# Patient Record
Sex: MALE | Race: OTHER | HISPANIC OR LATINO
[De-identification: names, ages, dates, MRNs, and addresses within clinical notes are randomized per-mention and may not be internally consistent; named-entity substitution may affect disease eponyms.]

---

## 2018-03-09 ENCOUNTER — APPOINTMENT (OUTPATIENT)
Dept: FAMILY MEDICINE | Facility: CLINIC | Age: 43
End: 2018-03-09
Payer: COMMERCIAL

## 2018-03-09 VITALS
DIASTOLIC BLOOD PRESSURE: 78 MMHG | BODY MASS INDEX: 29.72 KG/M2 | HEART RATE: 94 BPM | WEIGHT: 239 LBS | SYSTOLIC BLOOD PRESSURE: 121 MMHG | TEMPERATURE: 97.9 F | HEIGHT: 75 IN | OXYGEN SATURATION: 97 %

## 2018-03-09 DIAGNOSIS — Z86.59 PERSONAL HISTORY OF OTHER MENTAL AND BEHAVIORAL DISORDERS: ICD-10-CM

## 2018-03-09 PROCEDURE — 99213 OFFICE O/P EST LOW 20 MIN: CPT

## 2018-08-14 ENCOUNTER — APPOINTMENT (OUTPATIENT)
Dept: FAMILY MEDICINE | Facility: CLINIC | Age: 43
End: 2018-08-14
Payer: MEDICAID

## 2018-08-14 VITALS
BODY MASS INDEX: 31.08 KG/M2 | HEART RATE: 79 BPM | OXYGEN SATURATION: 98 % | WEIGHT: 250 LBS | TEMPERATURE: 98.8 F | HEIGHT: 75 IN | DIASTOLIC BLOOD PRESSURE: 88 MMHG | SYSTOLIC BLOOD PRESSURE: 133 MMHG

## 2018-08-14 PROCEDURE — G0444 DEPRESSION SCREEN ANNUAL: CPT

## 2018-08-14 PROCEDURE — 99214 OFFICE O/P EST MOD 30 MIN: CPT | Mod: 25

## 2018-08-14 NOTE — COUNSELING
[Weight management counseling provided] : Weight management [Healthy eating counseling provided] : healthy eating [Activity counseling provided] : activity [Behavioral health counseling provided] : behavioral health  [None] : None [Good understanding] : Patient has a good understanding of lifestyle changes and the steps needed to achieve self management goals [ - Annual Depression Screening] : Annual Depression Screening

## 2018-08-14 NOTE — ASSESSMENT
[FreeTextEntry1] : 44 y/o M w/ long hx of OCD, and  possible ADD never seen by specilaist, presents today to reassume meds, after lack of insurance for several months.\par \par -OCD: reassume paxil er 25mg daily.\par \par -ADD: neurology referral \par \par -CPE: pt advise to schedule appointment.

## 2018-08-14 NOTE — PHYSICAL EXAM

## 2018-08-14 NOTE — HISTORY OF PRESENT ILLNESS
[FreeTextEntry1] : med refill [de-identified] : Pt presents today for adderal refill. Not seen since 3/2018. States was with no insurance during this time.\par try Adderal for 1 month and states while on Adderall was feeling with better, attention and energy. never seen by neurology and have  a Diagnosis. 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

## 2018-08-17 ENCOUNTER — RX RENEWAL (OUTPATIENT)
Age: 43
End: 2018-08-17

## 2018-10-10 ENCOUNTER — RX RENEWAL (OUTPATIENT)
Age: 43
End: 2018-10-10

## 2018-10-10 RX ORDER — PAROXETINE HYDROCHLORIDE 20 MG/1
20 TABLET, FILM COATED ORAL
Qty: 60 | Refills: 3 | Status: DISCONTINUED | COMMUNITY
Start: 2018-08-17 | End: 2018-10-10

## 2018-12-19 ENCOUNTER — RX RENEWAL (OUTPATIENT)
Age: 43
End: 2018-12-19

## 2019-02-04 ENCOUNTER — APPOINTMENT (OUTPATIENT)
Dept: FAMILY MEDICINE | Facility: CLINIC | Age: 44
End: 2019-02-04

## 2019-04-29 ENCOUNTER — APPOINTMENT (OUTPATIENT)
Dept: FAMILY MEDICINE | Facility: CLINIC | Age: 44
End: 2019-04-29
Payer: MEDICAID

## 2019-04-29 VITALS
TEMPERATURE: 98.6 F | OXYGEN SATURATION: 98 % | HEIGHT: 75 IN | DIASTOLIC BLOOD PRESSURE: 84 MMHG | HEART RATE: 65 BPM | BODY MASS INDEX: 29.84 KG/M2 | WEIGHT: 240 LBS | SYSTOLIC BLOOD PRESSURE: 151 MMHG

## 2019-04-29 DIAGNOSIS — Z11.1 ENCOUNTER FOR SCREENING FOR RESPIRATORY TUBERCULOSIS: ICD-10-CM

## 2019-04-29 PROCEDURE — 99213 OFFICE O/P EST LOW 20 MIN: CPT

## 2019-04-29 NOTE — COUNSELING
[Weight management counseling provided] : Weight management [Healthy eating counseling provided] : healthy eating [Activity counseling provided] : activity [Target Wt Loss Goal ___] : Target weight loss goal [unfilled] lbs [Low Fat Diet] : Low fat diet [Low Salt Diet] : Low salt diet [Decrease Portions] : Decrease food portions

## 2019-04-29 NOTE — HEALTH RISK ASSESSMENT
[No falls in past year] : Patient reported no falls in the past year [0] : 2) Feeling down, depressed, or hopeless: Not at all (0) [] : No [de-identified] : occasional

## 2019-04-29 NOTE — ASSESSMENT
[FreeTextEntry1] : Patient will get a prescription for Adderall 10 mg to take once a day, last Rx 3/2018.\par Patient will also get a prescription for Endocet to take prior to laser hair removal procedures. A prescription for 30 tablets have been sent to her pharmacy.\par I-STOP was reviewed.\par Pt advised to make appointment for CPE at his earliest convenience.

## 2019-04-29 NOTE — PHYSICAL EXAM
[No Acute Distress] : no acute distress [Well Nourished] : well nourished [Well Developed] : well developed [Well-Appearing] : well-appearing [No Respiratory Distress] : no respiratory distress  [Clear to Auscultation] : lungs were clear to auscultation bilaterally [No Accessory Muscle Use] : no accessory muscle use [Normal Rate] : normal rate  [Regular Rhythm] : with a regular rhythm [Normal S1, S2] : normal S1 and S2 [No Murmur] : no murmur heard [Soft] : abdomen soft [Normal Bowel Sounds] : normal bowel sounds [Normal Gait] : normal gait [Coordination Grossly Intact] : coordination grossly intact [No Focal Deficits] : no focal deficits

## 2019-04-29 NOTE — HISTORY OF PRESENT ILLNESS
[FreeTextEntry1] : medication refill. [de-identified] : The patient presents to the office for prescription refills, no acute complaints at this time. Patient requests to have Rx for pain medication again since he will be going for laser hair removal this week.

## 2019-08-01 ENCOUNTER — RX RENEWAL (OUTPATIENT)
Age: 44
End: 2019-08-01

## 2021-04-22 ENCOUNTER — APPOINTMENT (OUTPATIENT)
Dept: FAMILY MEDICINE | Facility: CLINIC | Age: 46
End: 2021-04-22

## 2021-05-04 ENCOUNTER — APPOINTMENT (OUTPATIENT)
Dept: FAMILY MEDICINE | Facility: CLINIC | Age: 46
End: 2021-05-04

## 2021-06-03 ENCOUNTER — APPOINTMENT (OUTPATIENT)
Dept: FAMILY MEDICINE | Facility: CLINIC | Age: 46
End: 2021-06-03
Payer: COMMERCIAL

## 2021-06-03 VITALS
BODY MASS INDEX: 27.73 KG/M2 | DIASTOLIC BLOOD PRESSURE: 80 MMHG | TEMPERATURE: 97.2 F | WEIGHT: 223 LBS | OXYGEN SATURATION: 99 % | RESPIRATION RATE: 16 BRPM | SYSTOLIC BLOOD PRESSURE: 120 MMHG | HEIGHT: 75 IN | HEART RATE: 79 BPM

## 2021-06-03 PROCEDURE — 99212 OFFICE O/P EST SF 10 MIN: CPT | Mod: 25

## 2021-06-03 NOTE — ASSESSMENT
[FreeTextEntry1] : 44 y/o male with PMHx significant for OCD / ADD presenting to the office for prescription refills\par \par NEURO: OCD/ADD\par -Refilled Paxil 25 mg #90 tab 1 refill\par \par DERM: Hair removal, poison Ivy dermatitis\par -Medrol dose pack\par -Rx for Endocet #20 tabs given MDD 4 tabs\par I-STOP was reviewed, Reference #: [id number]\par \par HCM:\par -Denies getting Flu vaccine last year\par -CMP in house today\par -Pt advised to make appointment for CPE at his earliest convenience.\par -Recommended Covid vaccination.

## 2021-06-03 NOTE — HISTORY OF PRESENT ILLNESS
[FreeTextEntry1] : medication refill. [de-identified] : 46 y/o male with PMHx significant for OCD / ADD presenting to the office for prescription refills, requests to have Rx for pain medication, OCD. Pt states that he has poison ivy and has been using Calamine lotion with minor relief.

## 2021-06-03 NOTE — COUNSELING
[Weight management counseling provided] : Weight management [Healthy eating counseling provided] : healthy eating [Activity counseling provided] : activity [Target Wt Loss Goal ___] : Target weight loss goal [unfilled] lbs [Low Fat Diet] : Low fat diet [Low Salt Diet] : Low salt diet [Decrease Portions] : Decrease food portions [AUDIT-C Screening administered and reviewed] : AUDIT-C Screening administered and reviewed

## 2021-06-03 NOTE — HEALTH RISK ASSESSMENT
[No falls in past year] : Patient reported no falls in the past year [0] : 2) Feeling down, depressed, or hopeless: Not at all (0) [Monthly or less (1 pt)] : Monthly or less (1 point) [Yes] : Yes [1 or 2 (0 pts)] : 1 or 2 (0 points) [Never (0 pts)] : Never (0 points) [No] : In the past 12 months have you used drugs other than those required for medical reasons? No [With Patient/Caregiver] : With Patient/Caregiver [] : No [de-identified] : occasional [Audit-CScore] : 1 [de-identified] : running [de-identified] : "healthy" [[id number]] : 0 [AdvancecareDate] : 06/21

## 2021-06-03 NOTE — PHYSICAL EXAM
[No Acute Distress] : no acute distress [Well Nourished] : well nourished [Well Developed] : well developed [Well-Appearing] : well-appearing [No Respiratory Distress] : no respiratory distress  [Clear to Auscultation] : lungs were clear to auscultation bilaterally [No Accessory Muscle Use] : no accessory muscle use [Normal Rate] : normal rate  [Regular Rhythm] : with a regular rhythm [Normal S1, S2] : normal S1 and S2 [No Murmur] : no murmur heard [Soft] : abdomen soft [Normal Bowel Sounds] : normal bowel sounds [Normal Gait] : normal gait [Coordination Grossly Intact] : coordination grossly intact [No Focal Deficits] : no focal deficits [de-identified] : papular rash in UE, no apparent distribution, non vesicular

## 2021-06-07 LAB
ALBUMIN SERPL ELPH-MCNC: 4.9 G/DL
ALP BLD-CCNC: 65 U/L
ALT SERPL-CCNC: 58 U/L
ANION GAP SERPL CALC-SCNC: 15 MMOL/L
AST SERPL-CCNC: 32 U/L
BILIRUB SERPL-MCNC: 0.5 MG/DL
BUN SERPL-MCNC: 10 MG/DL
CALCIUM SERPL-MCNC: 9.3 MG/DL
CHLORIDE SERPL-SCNC: 98 MMOL/L
CO2 SERPL-SCNC: 25 MMOL/L
CREAT SERPL-MCNC: 0.9 MG/DL
GLUCOSE SERPL-MCNC: 86 MG/DL
POTASSIUM SERPL-SCNC: 4 MMOL/L
PROT SERPL-MCNC: 7.4 G/DL
SODIUM SERPL-SCNC: 138 MMOL/L

## 2021-12-13 ENCOUNTER — RX RENEWAL (OUTPATIENT)
Age: 46
End: 2021-12-13

## 2022-01-11 ENCOUNTER — APPOINTMENT (OUTPATIENT)
Dept: FAMILY MEDICINE | Facility: CLINIC | Age: 47
End: 2022-01-11

## 2022-01-28 ENCOUNTER — APPOINTMENT (OUTPATIENT)
Dept: FAMILY MEDICINE | Facility: CLINIC | Age: 47
End: 2022-01-28
Payer: COMMERCIAL

## 2022-01-28 VITALS
RESPIRATION RATE: 12 BRPM | SYSTOLIC BLOOD PRESSURE: 126 MMHG | DIASTOLIC BLOOD PRESSURE: 80 MMHG | OXYGEN SATURATION: 98 % | HEIGHT: 75 IN | TEMPERATURE: 98.3 F | BODY MASS INDEX: 27.98 KG/M2 | HEART RATE: 88 BPM | WEIGHT: 225 LBS

## 2022-01-28 DIAGNOSIS — Z23 ENCOUNTER FOR IMMUNIZATION: ICD-10-CM

## 2022-01-28 DIAGNOSIS — L23.7 ALLERGIC CONTACT DERMATITIS DUE TO PLANTS, EXCEPT FOOD: ICD-10-CM

## 2022-01-28 PROCEDURE — 99212 OFFICE O/P EST SF 10 MIN: CPT | Mod: 25

## 2022-01-28 RX ORDER — METHYLPREDNISOLONE 4 MG/1
4 TABLET ORAL
Qty: 1 | Refills: 1 | Status: COMPLETED | COMMUNITY
Start: 2021-06-03 | End: 2022-01-28

## 2022-01-28 NOTE — HISTORY OF PRESENT ILLNESS
[FreeTextEntry1] : medication refill. [de-identified] : 45 y/o male with PMHx significant for chronic neck pain, OCD / ADD presenting to the office for prescription refills, requests to have Rx for pain medication for his chronic neck pain and medication for his OCD / ADD.

## 2022-01-28 NOTE — COUNSELING
[AUDIT-C Screening administered and reviewed] : AUDIT-C Screening administered and reviewed [Weight management counseling provided] : Weight management [Healthy eating counseling provided] : healthy eating [Activity counseling provided] : activity [Target Wt Loss Goal ___] : Target weight loss goal [unfilled] lbs [Low Fat Diet] : Low fat diet [Low Salt Diet] : Low salt diet [Decrease Portions] : Decrease food portions

## 2022-01-28 NOTE — ASSESSMENT
[FreeTextEntry1] : 44 y/o male with PMHx significant for OCD / ADD presenting to the office for prescription refills\par \par NEURO: OCD/ADD\par -Refilled Paxil 25 mg #90 tab 1 refill\par \par MSK: Neck pain\par -Rx for Endocet #20 tabs given MDD 4 tabs\par I-STOP was reviewed, Reference #: [id number].\par \par HCM:\par -Denies getting Flu vaccine last year\par -CMP, fasting lipids in house today\par -Pt advised to make appointment for CPE at his earliest convenience.\par -PFIZER Covid vaccine completed 7/19/21, 8/9/21

## 2022-01-28 NOTE — HEALTH RISK ASSESSMENT
[Yes] : Yes [Never (0 pts)] : Never (0 points) [No] : In the past 12 months have you used drugs other than those required for medical reasons? No [No falls in past year] : Patient reported no falls in the past year [0] : 2) Feeling down, depressed, or hopeless: Not at all (0) [Never] : Never [2 - 4 times a month (2 pts)] : 2-4 times a month (2 points) [3 or 4 (1 pt)] : 3 or 4  (1 point) [PHQ-2 Negative - No further assessment needed] : PHQ-2 Negative - No further assessment needed [Patient/Caregiver unclear of wishes] : , patient/caregiver unclear of wishes [Reviewed no changes] : Reviewed, no changes [Audit-CScore] : 3 [de-identified] : running [de-identified] : "healthy" [FUK4Nterm] : 0 [AdvancecareDate] : 01/22

## 2022-01-28 NOTE — PHYSICAL EXAM
[No Acute Distress] : no acute distress [Well Nourished] : well nourished [Well Developed] : well developed [Well-Appearing] : well-appearing [No Respiratory Distress] : no respiratory distress  [Clear to Auscultation] : lungs were clear to auscultation bilaterally [No Accessory Muscle Use] : no accessory muscle use [Normal Rate] : normal rate  [Regular Rhythm] : with a regular rhythm [Normal S1, S2] : normal S1 and S2 [No Murmur] : no murmur heard [Soft] : abdomen soft [Normal Bowel Sounds] : normal bowel sounds [Normal Gait] : normal gait [Coordination Grossly Intact] : coordination grossly intact [No Focal Deficits] : no focal deficits [de-identified] : papular rash in UE, no apparent distribution, non vesicular [Normal] : normal gait, coordination grossly intact, no focal deficits and deep tendon reflexes were 2+ and symmetric

## 2022-02-07 LAB
ALBUMIN SERPL ELPH-MCNC: 5.1 G/DL
ALP BLD-CCNC: 64 U/L
ALT SERPL-CCNC: 31 U/L
ANION GAP SERPL CALC-SCNC: 17 MMOL/L
AST SERPL-CCNC: 26 U/L
BILIRUB SERPL-MCNC: 0.5 MG/DL
BUN SERPL-MCNC: 10 MG/DL
CALCIUM SERPL-MCNC: 9.6 MG/DL
CHLORIDE SERPL-SCNC: 103 MMOL/L
CO2 SERPL-SCNC: 23 MMOL/L
CREAT SERPL-MCNC: 0.9 MG/DL
GLUCOSE SERPL-MCNC: 95 MG/DL
POTASSIUM SERPL-SCNC: 4 MMOL/L
PROT SERPL-MCNC: 7.5 G/DL
SODIUM SERPL-SCNC: 143 MMOL/L

## 2022-02-28 LAB
CHOLEST SERPL-MCNC: 211 MG/DL
HDLC SERPL-MCNC: 59 MG/DL
LDLC SERPL CALC-MCNC: 132 MG/DL
NONHDLC SERPL-MCNC: 153 MG/DL
TRIGL SERPL-MCNC: 105 MG/DL

## 2022-03-03 ENCOUNTER — NON-APPOINTMENT (OUTPATIENT)
Age: 47
End: 2022-03-03

## 2022-03-18 ENCOUNTER — APPOINTMENT (OUTPATIENT)
Dept: FAMILY MEDICINE | Facility: CLINIC | Age: 47
End: 2022-03-18
Payer: COMMERCIAL

## 2022-03-18 VITALS
SYSTOLIC BLOOD PRESSURE: 130 MMHG | HEIGHT: 75 IN | BODY MASS INDEX: 26.36 KG/M2 | DIASTOLIC BLOOD PRESSURE: 80 MMHG | HEART RATE: 80 BPM | RESPIRATION RATE: 16 BRPM | OXYGEN SATURATION: 97 % | TEMPERATURE: 98 F | WEIGHT: 212 LBS

## 2022-03-18 PROCEDURE — 99213 OFFICE O/P EST LOW 20 MIN: CPT

## 2022-03-18 NOTE — COUNSELING
[AUDIT-C Screening administered and reviewed] : AUDIT-C Screening administered and reviewed [Fall prevention counseling provided] : Fall prevention counseling provided [Adequate lighting] : Adequate lighting [No throw rugs] : No throw rugs [Use proper foot wear] : Use proper foot wear [Behavioral health counseling provided] : Behavioral health counseling provided [Sleep ___ hours/day] : Sleep [unfilled] hours/day [Engage in a relaxing activity] : Engage in a relaxing activity [Plan in advance] : Plan in advance [Encouraged to increase physical activity] : Encouraged to increase physical activity [____ min/wk Activity] : [unfilled] min/wk activity

## 2022-03-18 NOTE — HEALTH RISK ASSESSMENT
[Never] : Never [Yes] : Yes [2 - 4 times a month (2 pts)] : 2-4 times a month (2 points) [3 or 4 (1 pt)] : 3 or 4  (1 point) [Never (0 pts)] : Never (0 points) [No] : In the past 12 months have you used drugs other than those required for medical reasons? No [No falls in past year] : Patient reported no falls in the past year [0] : 2) Feeling down, depressed, or hopeless: Not at all (0) [PHQ-2 Negative - No further assessment needed] : PHQ-2 Negative - No further assessment needed [Patient/Caregiver unclear of wishes] : , patient/caregiver unclear of wishes [Reviewed no changes] : Reviewed, no changes [Audit-CScore] : 3 [de-identified] : running [de-identified] : "healthy" [[id number]] : 0 [AdvancecareDate] : 01/22

## 2022-03-18 NOTE — ASSESSMENT
[FreeTextEntry1] : 46 y/o male with PMHx significant for OCD / ADD presenting to the office for prescription refills\par \par NEURO: OCD/ADD\par -Continue Paxil 25 mg daily\par -Neurology referral for neurocognitive testing for ADD with Dr Napier\par -Adderall 10 mg tab refilled, #30 tabs, will need proper diagnosis for further refills.\par \par MSK: Neck pain\par -Rx for Endocet #20 tabs given MDD 4 tabs\par -I-STOP was reviewed, Reference #: [id number].\par \par [facility]\par -Fasting lipid results reviewed, discussed need for better diet, will again test during next visit\par \par HCM:\par -Denies getting Flu vaccine last year\par -CMP, fasting lipid results reviewed\par -Pt advised to make appointment for CPE at his earliest convenience.\par -PFIZER Covid vaccine completed 7/19/21, 8/9/21

## 2022-03-18 NOTE — HISTORY OF PRESENT ILLNESS
[FreeTextEntry1] : medication refill. [de-identified] : 45 y/o male with PMHx significant for chronic neck pain, OCD / ADD presenting to the office for prescription refills, requests to have Rx for pain medication for his chronic neck pain and medication for his OCD / ADD. States that taking the Adderall again has made him perform better at his job.

## 2023-06-20 ENCOUNTER — RX RENEWAL (OUTPATIENT)
Age: 48
End: 2023-06-20

## 2023-06-30 ENCOUNTER — RX RENEWAL (OUTPATIENT)
Age: 48
End: 2023-06-30

## 2023-08-29 ENCOUNTER — APPOINTMENT (OUTPATIENT)
Dept: FAMILY MEDICINE | Facility: CLINIC | Age: 48
End: 2023-08-29
Payer: COMMERCIAL

## 2023-08-29 VITALS
SYSTOLIC BLOOD PRESSURE: 126 MMHG | OXYGEN SATURATION: 98 % | TEMPERATURE: 98 F | BODY MASS INDEX: 31.33 KG/M2 | HEIGHT: 75 IN | HEART RATE: 73 BPM | RESPIRATION RATE: 16 BRPM | WEIGHT: 252 LBS | DIASTOLIC BLOOD PRESSURE: 80 MMHG

## 2023-08-29 DIAGNOSIS — G89.29 CERVICALGIA: ICD-10-CM

## 2023-08-29 DIAGNOSIS — F98.8 OTHER SPECIFIED BEHAVIORAL AND EMOTIONAL DISORDERS WITH ONSET USUALLY OCCURRING IN CHILDHOOD AND ADOLESCENCE: ICD-10-CM

## 2023-08-29 DIAGNOSIS — N52.9 MALE ERECTILE DYSFUNCTION, UNSPECIFIED: ICD-10-CM

## 2023-08-29 DIAGNOSIS — F42.9 OBSESSIVE-COMPULSIVE DISORDER, UNSPECIFIED: ICD-10-CM

## 2023-08-29 DIAGNOSIS — M54.2 CERVICALGIA: ICD-10-CM

## 2023-08-29 DIAGNOSIS — R53.83 OTHER FATIGUE: ICD-10-CM

## 2023-08-29 DIAGNOSIS — E78.5 HYPERLIPIDEMIA, UNSPECIFIED: ICD-10-CM

## 2023-08-29 PROCEDURE — 99214 OFFICE O/P EST MOD 30 MIN: CPT | Mod: 25

## 2023-08-29 RX ORDER — PAROXETINE 25 MG/1
25 TABLET, FILM COATED, EXTENDED RELEASE ORAL
Qty: 90 | Refills: 3 | Status: ACTIVE | COMMUNITY
Start: 2018-08-14 | End: 1900-01-01

## 2023-08-29 NOTE — COUNSELING
[Fall prevention counseling provided] : Fall prevention counseling provided [Adequate lighting] : Adequate lighting [No throw rugs] : No throw rugs [Use proper foot wear] : Use proper foot wear [Behavioral health counseling provided] : Behavioral health counseling provided [Sleep ___ hours/day] : Sleep [unfilled] hours/day [Engage in a relaxing activity] : Engage in a relaxing activity [Plan in advance] : Plan in advance [AUDIT-C Screening administered and reviewed] : AUDIT-C Screening administered and reviewed [Encouraged to increase physical activity] : Encouraged to increase physical activity [____ min/wk Activity] : [unfilled] min/wk activity [Yes] : Risk of tobacco use and health benefits of smoking cessation discussed: Yes [Decrease Portions] : decrease portions [None] : None [Good understanding] : Patient has a good understanding of lifestyle changes and steps needed to achieve self management goal [Hazards of at-risk alcohol use discussed] : Hazards of at-risk alcohol use discussed [Strategies to reduce or eliminate alcohol use discussed] : Strategies to reduce or eliminate alcohol use discussed [Quit Drinking] : Quit Drinking

## 2023-08-29 NOTE — HISTORY OF PRESENT ILLNESS
[FreeTextEntry1] : medication refill. [de-identified] : 47 y/o male with PMHx significant for chronic neck pain, OCD / ADD presenting to the office for prescription refills, requests to have Rx for pain medication for his chronic neck pain and medication for his OCD. Pt states that he has not been taking his Adderall in many months; complains of feeling extremely tired all the time and would like to have his testosterone level checked. Denies any sleeping problems / feeling sleepy or snoring, no apneic episodes that he could tell.

## 2023-08-29 NOTE — ASSESSMENT
[FreeTextEntry1] : 49 y/o male with PMHx significant for OCD / ADD presenting to the office for prescription refills complaining of feeling extremely tired, requests to have testosterone check.  GENERAL: Fatigue / tiredness -Check Testosterone, TSH, CBC, CMP -Explained Testosterone level is optimal / more accurate if drawn early in the morning, states that he has no time to come early or go to the lab  NEURO: OCD/ADD -Continue Paxil 25 mg daily, e-refilled -Neurology referral for neurocognitive testing for ADD with Dr Napier, never done. -No longer on Adderall 10 mg tab.  MSK: Neck pain -Rx for Endocet #20 tabs given MDD 4 tabs -I-STOP was reviewed, Reference #: [id number]  [facility] -Low fat low cholesterol diet   HCM: -Denies getting Flu vaccine last year -CMP, testosterone, CBC, TSH -Pt advised to make appointment for CPE at his earliest convenience. -PFIZER Covid vaccine completed 7/19/21, 8/9/21

## 2023-08-29 NOTE — HEALTH RISK ASSESSMENT
[Yes] : Yes [Never (0 pts)] : Never (0 points) [No] : In the past 12 months have you used drugs other than those required for medical reasons? No [No falls in past year] : Patient reported no falls in the past year [0] : 2) Feeling down, depressed, or hopeless: Not at all (0) [PHQ-2 Negative - No further assessment needed] : PHQ-2 Negative - No further assessment needed [Patient/Caregiver unclear of wishes] : , patient/caregiver unclear of wishes [Reviewed no changes] : Reviewed, no changes [2 - 4 times a month (2 pts)] : 2-4 times a month (2 points) [10 or more (4 pts)] : 10 or more (4  points) [Never] : Never [Audit-CScore] : 6 [de-identified] : none [de-identified] : "healthy" [[id number]] : 0 [AdvancecareDate] : 01/22

## 2023-08-30 LAB
ALBUMIN SERPL ELPH-MCNC: 4.8 G/DL
ALP BLD-CCNC: 72 U/L
ALT SERPL-CCNC: 22 U/L
ANION GAP SERPL CALC-SCNC: 12 MMOL/L
AST SERPL-CCNC: 19 U/L
BILIRUB SERPL-MCNC: 0.3 MG/DL
BUN SERPL-MCNC: 9 MG/DL
CALCIUM SERPL-MCNC: 9.7 MG/DL
CHLORIDE SERPL-SCNC: 106 MMOL/L
CO2 SERPL-SCNC: 24 MMOL/L
CREAT SERPL-MCNC: 0.8 MG/DL
EGFR: 110 ML/MIN/1.73M2
GLUCOSE SERPL-MCNC: 92 MG/DL
POTASSIUM SERPL-SCNC: 4.2 MMOL/L
PROT SERPL-MCNC: 7.3 G/DL
SODIUM SERPL-SCNC: 142 MMOL/L
TESTOST SERPL-MCNC: 446 NG/DL
TSH SERPL-ACNC: 1.08 UIU/ML

## 2023-09-01 ENCOUNTER — NON-APPOINTMENT (OUTPATIENT)
Age: 48
End: 2023-09-01